# Patient Record
Sex: MALE | Race: WHITE | NOT HISPANIC OR LATINO | Employment: FULL TIME | URBAN - METROPOLITAN AREA
[De-identification: names, ages, dates, MRNs, and addresses within clinical notes are randomized per-mention and may not be internally consistent; named-entity substitution may affect disease eponyms.]

---

## 2023-07-15 DIAGNOSIS — I10 ESSENTIAL (PRIMARY) HYPERTENSION: ICD-10-CM

## 2023-07-17 PROBLEM — I10 HYPERTENSION: Status: ACTIVE | Noted: 2023-07-17

## 2023-07-17 PROBLEM — L72.3 INFLAMED SEBACEOUS CYST: Status: RESOLVED | Noted: 2023-07-17 | Resolved: 2023-07-17

## 2023-07-17 PROBLEM — I27.20 PULMONARY HYPERTENSION, MILD (MULTI): Status: ACTIVE | Noted: 2023-07-17

## 2023-07-17 PROBLEM — J93.83 SPONTANEOUS PNEUMOTHORAX: Status: RESOLVED | Noted: 2023-07-17 | Resolved: 2023-07-17

## 2023-07-17 PROBLEM — G47.33 OBSTRUCTIVE SLEEP APNEA, ADULT: Status: ACTIVE | Noted: 2023-07-17

## 2023-07-17 PROBLEM — S01.81XA LACERATION OF FACE, MULTIPLE SITES: Status: RESOLVED | Noted: 2023-07-17 | Resolved: 2023-07-17

## 2023-07-17 PROBLEM — E78.5 DYSLIPIDEMIA: Status: ACTIVE | Noted: 2023-07-17

## 2023-07-17 RX ORDER — AMLODIPINE AND BENAZEPRIL HYDROCHLORIDE 5; 10 MG/1; MG/1
1 CAPSULE ORAL DAILY
COMMUNITY

## 2023-07-17 RX ORDER — AMLODIPINE AND BENAZEPRIL HYDROCHLORIDE 5; 10 MG/1; MG/1
CAPSULE ORAL
Qty: 30 CAPSULE | Refills: 11 | Status: SHIPPED | OUTPATIENT
Start: 2023-07-17 | End: 2024-04-16 | Stop reason: SDUPTHER

## 2024-04-16 ENCOUNTER — OFFICE VISIT (OUTPATIENT)
Dept: PRIMARY CARE | Facility: CLINIC | Age: 44
End: 2024-04-16
Payer: COMMERCIAL

## 2024-04-16 ENCOUNTER — LAB (OUTPATIENT)
Dept: LAB | Facility: LAB | Age: 44
End: 2024-04-16
Payer: COMMERCIAL

## 2024-04-16 ENCOUNTER — HOSPITAL ENCOUNTER (OUTPATIENT)
Dept: RADIOLOGY | Facility: CLINIC | Age: 44
Discharge: HOME | End: 2024-04-16
Payer: COMMERCIAL

## 2024-04-16 VITALS
HEIGHT: 71 IN | BODY MASS INDEX: 23.38 KG/M2 | TEMPERATURE: 97.7 F | WEIGHT: 167 LBS | SYSTOLIC BLOOD PRESSURE: 130 MMHG | HEART RATE: 97 BPM | OXYGEN SATURATION: 100 % | DIASTOLIC BLOOD PRESSURE: 70 MMHG

## 2024-04-16 DIAGNOSIS — Z00.00 ROUTINE GENERAL MEDICAL EXAMINATION AT A HEALTH CARE FACILITY: Primary | ICD-10-CM

## 2024-04-16 DIAGNOSIS — M54.50 ACUTE BILATERAL LOW BACK PAIN WITHOUT SCIATICA: ICD-10-CM

## 2024-04-16 DIAGNOSIS — I27.20 PULMONARY HYPERTENSION, MILD (MULTI): ICD-10-CM

## 2024-04-16 DIAGNOSIS — Z00.00 ROUTINE GENERAL MEDICAL EXAMINATION AT A HEALTH CARE FACILITY: ICD-10-CM

## 2024-04-16 DIAGNOSIS — G47.33 OBSTRUCTIVE SLEEP APNEA, ADULT: ICD-10-CM

## 2024-04-16 LAB
APPEARANCE UR: CLEAR
BILIRUB UR STRIP.AUTO-MCNC: NEGATIVE MG/DL
COLOR UR: YELLOW
COTININE UR QL SCN: NEGATIVE
GLUCOSE UR STRIP.AUTO-MCNC: NEGATIVE MG/DL
KETONES UR STRIP.AUTO-MCNC: NEGATIVE MG/DL
LEUKOCYTE ESTERASE UR QL STRIP.AUTO: NEGATIVE
NITRITE UR QL STRIP.AUTO: NEGATIVE
PH UR STRIP.AUTO: 5 [PH]
PROT UR STRIP.AUTO-MCNC: NEGATIVE MG/DL
RBC # UR STRIP.AUTO: NEGATIVE /UL
SP GR UR STRIP.AUTO: 1.02
UROBILINOGEN UR STRIP.AUTO-MCNC: <2 MG/DL

## 2024-04-16 PROCEDURE — 99396 PREV VISIT EST AGE 40-64: CPT | Performed by: FAMILY MEDICINE

## 2024-04-16 PROCEDURE — 81003 URINALYSIS AUTO W/O SCOPE: CPT

## 2024-04-16 PROCEDURE — 72110 X-RAY EXAM L-2 SPINE 4/>VWS: CPT

## 2024-04-16 PROCEDURE — 72110 X-RAY EXAM L-2 SPINE 4/>VWS: CPT | Performed by: RADIOLOGY

## 2024-04-16 PROCEDURE — 3078F DIAST BP <80 MM HG: CPT | Performed by: FAMILY MEDICINE

## 2024-04-16 PROCEDURE — 3075F SYST BP GE 130 - 139MM HG: CPT | Performed by: FAMILY MEDICINE

## 2024-04-16 RX ORDER — PREDNISONE 10 MG/1
TABLET ORAL
COMMUNITY
Start: 2024-04-09

## 2024-04-16 ASSESSMENT — ENCOUNTER SYMPTOMS
FATIGUE: 0
COUGH: 0
SORE THROAT: 0
VOMITING: 0
BLOOD IN STOOL: 0
TROUBLE SWALLOWING: 0
JOINT SWELLING: 0
WEAKNESS: 0
CONFUSION: 0
PALPITATIONS: 0
HEADACHES: 0
SHORTNESS OF BREATH: 0
FEVER: 0
HALLUCINATIONS: 0
DYSURIA: 0
FREQUENCY: 0
DIZZINESS: 0
UNEXPECTED WEIGHT CHANGE: 0
EYE PAIN: 0
NAUSEA: 0
NUMBNESS: 0
DIARRHEA: 0
HEMATURIA: 0
ABDOMINAL PAIN: 0
BACK PAIN: 1
DECREASED CONCENTRATION: 0

## 2024-04-16 ASSESSMENT — PATIENT HEALTH QUESTIONNAIRE - PHQ9
1. LITTLE INTEREST OR PLEASURE IN DOING THINGS: NOT AT ALL
2. FEELING DOWN, DEPRESSED OR HOPELESS: NOT AT ALL
SUM OF ALL RESPONSES TO PHQ9 QUESTIONS 1 AND 2: 0

## 2024-04-16 ASSESSMENT — PAIN SCALES - GENERAL: PAINLEVEL: 1

## 2024-04-16 NOTE — PROGRESS NOTES
Subjective   Patient ID: Kvng Harper is a 44 y.o. male.    Kvng Harper comes in today for physical exam.  There has been no chest pain, shortness of breath, fever, chills, unexplained weight loss, rectal bleeding or any other unusual symptoms.  He did hold his back out but really had no initiating event.  He is on steroids.  It is quite a bit better but we discussed getting an x-ray to rule out pathology.  Blood pressure is at goal.  He has sleep apnea and is not tolerating CPAP very well.  Recent labs, diagnostics and pertinent information has been reviewed. Patient is attempting to eat a healthy diet and incorporate exercise in to their lifestyle. Patient does not smoke. Alcohol in moderation. Patient is practicing routine eye and dental care. Reviewed employment status. No uncontrolled anxiety, depression. Reviewed current medications, if any.  History of pulmonary hypertension and is due for another echo.          Review of Systems   Constitutional:  Negative for fatigue, fever and unexpected weight change.   HENT:  Negative for congestion, ear pain, hearing loss, sore throat and trouble swallowing.    Eyes:  Negative for pain and visual disturbance.   Respiratory:  Negative for cough and shortness of breath.    Cardiovascular:  Negative for chest pain, palpitations and leg swelling.   Gastrointestinal:  Negative for abdominal pain, blood in stool, diarrhea, nausea and vomiting.   Genitourinary:  Negative for dysuria, frequency, hematuria and urgency.   Musculoskeletal:  Positive for back pain. Negative for joint swelling.   Skin:  Negative for pallor and rash.   Neurological:  Negative for dizziness, syncope, weakness, numbness and headaches.   Psychiatric/Behavioral:  Negative for confusion, decreased concentration, hallucinations and suicidal ideas.      Vitals:    04/16/24 0902   BP: 130/70   Pulse: 97   Temp: 36.5 °C (97.7 °F)   SpO2: 100%      Objective   Physical Exam  Constitutional:        Appearance: Normal appearance. He is not toxic-appearing.   HENT:      Head: Normocephalic and atraumatic.      Right Ear: Tympanic membrane normal.      Left Ear: Tympanic membrane normal.      Nose: Nose normal.      Mouth/Throat:      Mouth: Mucous membranes are moist.      Pharynx: Oropharynx is clear. No oropharyngeal exudate or posterior oropharyngeal erythema.   Eyes:      General: No scleral icterus.     Extraocular Movements: Extraocular movements intact.      Conjunctiva/sclera: Conjunctivae normal.      Pupils: Pupils are equal, round, and reactive to light.   Cardiovascular:      Rate and Rhythm: Normal rate and regular rhythm.      Pulses: Normal pulses.   Pulmonary:      Effort: No respiratory distress.      Breath sounds: Normal breath sounds. No wheezing or rhonchi.   Abdominal:      General: Abdomen is flat. Bowel sounds are normal.      Palpations: Abdomen is soft. There is no mass.      Tenderness: There is no abdominal tenderness. There is no guarding.   Musculoskeletal:         General: Normal range of motion.      Cervical back: No rigidity or tenderness.   Lymphadenopathy:      Cervical: No cervical adenopathy.   Skin:     General: Skin is warm and dry.      Findings: No rash.   Neurological:      General: No focal deficit present.      Mental Status: He is alert and oriented to person, place, and time.      Cranial Nerves: No cranial nerve deficit.      Motor: No weakness.   Psychiatric:         Mood and Affect: Mood normal.         Behavior: Behavior normal.         Thought Content: Thought content normal.         Assessment/Plan   Diagnoses and all orders for this visit:  Obstructive sleep apnea, adult  -     Transthoracic Echo (TTE) Complete; Future  Pulmonary hypertension, mild (Multi)  -     Transthoracic Echo (TTE) Complete; Future  Routine general medical examination at a health care facility  -     CBC and Auto Differential; Future  -     Comprehensive Metabolic Panel; Future  -      Lipid Panel; Future  -     Thyroid Stimulating Hormone; Future  -     Urinalysis with Reflex Microscopic; Future  -     Vitamin D 25-Hydroxy,Total (for eval of Vitamin D levels); Future

## 2024-04-16 NOTE — PATIENT INSTRUCTIONS
It was nice to see you today!  Discussed current concerns and addressed   Reviewed recent labs and diagnostics  Reviewed medications list  Continue to eat a healthy diet, exercise at least 3 times a week or more  Plan and follow up discussed  For any further information related to your condition, copy and paste or go to [email or web address]  Check another echo to evaluate the pulmonary hypertension.  Continue prednisone and exercises for the back and we will check x-rays.  Routine labs ordered.

## 2024-04-18 ENCOUNTER — LAB (OUTPATIENT)
Dept: LAB | Facility: LAB | Age: 44
End: 2024-04-18
Payer: COMMERCIAL

## 2024-04-18 DIAGNOSIS — Z00.00 ROUTINE GENERAL MEDICAL EXAMINATION AT A HEALTH CARE FACILITY: ICD-10-CM

## 2024-04-18 LAB
25(OH)D3 SERPL-MCNC: 21 NG/ML (ref 30–100)
ALBUMIN SERPL BCP-MCNC: 4.8 G/DL (ref 3.4–5)
ALP SERPL-CCNC: 70 U/L (ref 33–120)
ALT SERPL W P-5'-P-CCNC: 50 U/L (ref 10–52)
ANION GAP SERPL CALC-SCNC: 13 MMOL/L (ref 10–20)
AST SERPL W P-5'-P-CCNC: 18 U/L (ref 9–39)
BASOPHILS # BLD AUTO: 0.02 X10*3/UL (ref 0–0.1)
BASOPHILS NFR BLD AUTO: 0.2 %
BILIRUB SERPL-MCNC: 0.6 MG/DL (ref 0–1.2)
BUN SERPL-MCNC: 20 MG/DL (ref 6–23)
CALCIUM SERPL-MCNC: 9.4 MG/DL (ref 8.6–10.3)
CHLORIDE SERPL-SCNC: 105 MMOL/L (ref 98–107)
CHOLEST SERPL-MCNC: 205 MG/DL (ref 0–199)
CHOLESTEROL/HDL RATIO: 3.3
CO2 SERPL-SCNC: 24 MMOL/L (ref 21–32)
CREAT SERPL-MCNC: 1.04 MG/DL (ref 0.5–1.3)
EGFRCR SERPLBLD CKD-EPI 2021: 90 ML/MIN/1.73M*2
EOSINOPHIL # BLD AUTO: 0.01 X10*3/UL (ref 0–0.7)
EOSINOPHIL NFR BLD AUTO: 0.1 %
ERYTHROCYTE [DISTWIDTH] IN BLOOD BY AUTOMATED COUNT: 12.8 % (ref 11.5–14.5)
GLUCOSE SERPL-MCNC: 102 MG/DL (ref 74–99)
HCT VFR BLD AUTO: 46.3 % (ref 41–52)
HDLC SERPL-MCNC: 61.5 MG/DL
HGB BLD-MCNC: 15.6 G/DL (ref 13.5–17.5)
IMM GRANULOCYTES # BLD AUTO: 0.12 X10*3/UL (ref 0–0.7)
IMM GRANULOCYTES NFR BLD AUTO: 1.4 % (ref 0–0.9)
LDLC SERPL CALC-MCNC: 132 MG/DL
LYMPHOCYTES # BLD AUTO: 1.38 X10*3/UL (ref 1.2–4.8)
LYMPHOCYTES NFR BLD AUTO: 15.6 %
MCH RBC QN AUTO: 28 PG (ref 26–34)
MCHC RBC AUTO-ENTMCNC: 33.7 G/DL (ref 32–36)
MCV RBC AUTO: 83 FL (ref 80–100)
MONOCYTES # BLD AUTO: 0.39 X10*3/UL (ref 0.1–1)
MONOCYTES NFR BLD AUTO: 4.4 %
NEUTROPHILS # BLD AUTO: 6.93 X10*3/UL (ref 1.2–7.7)
NEUTROPHILS NFR BLD AUTO: 78.3 %
NON HDL CHOLESTEROL: 144 MG/DL (ref 0–149)
NRBC BLD-RTO: 0 /100 WBCS (ref 0–0)
PLATELET # BLD AUTO: 272 X10*3/UL (ref 150–450)
POTASSIUM SERPL-SCNC: 4.3 MMOL/L (ref 3.5–5.3)
PROT SERPL-MCNC: 6.8 G/DL (ref 6.4–8.2)
RBC # BLD AUTO: 5.57 X10*6/UL (ref 4.5–5.9)
SODIUM SERPL-SCNC: 138 MMOL/L (ref 136–145)
TRIGL SERPL-MCNC: 57 MG/DL (ref 0–149)
TSH SERPL-ACNC: 0.75 MIU/L (ref 0.44–3.98)
VLDL: 11 MG/DL (ref 0–40)
WBC # BLD AUTO: 8.9 X10*3/UL (ref 4.4–11.3)

## 2024-04-18 PROCEDURE — 80053 COMPREHEN METABOLIC PANEL: CPT

## 2024-04-18 PROCEDURE — 85025 COMPLETE CBC W/AUTO DIFF WBC: CPT

## 2024-04-18 PROCEDURE — 82306 VITAMIN D 25 HYDROXY: CPT

## 2024-04-18 PROCEDURE — 84443 ASSAY THYROID STIM HORMONE: CPT

## 2024-04-18 PROCEDURE — 80061 LIPID PANEL: CPT

## 2024-04-18 PROCEDURE — 36415 COLL VENOUS BLD VENIPUNCTURE: CPT

## 2024-05-13 ENCOUNTER — APPOINTMENT (OUTPATIENT)
Dept: CARDIOLOGY | Facility: HOSPITAL | Age: 44
End: 2024-05-13
Payer: COMMERCIAL

## 2024-05-21 ENCOUNTER — HOSPITAL ENCOUNTER (OUTPATIENT)
Dept: CARDIOLOGY | Facility: HOSPITAL | Age: 44
Discharge: HOME | End: 2024-05-21
Payer: COMMERCIAL

## 2024-05-21 DIAGNOSIS — I27.20 PULMONARY HYPERTENSION, MILD (MULTI): ICD-10-CM

## 2024-05-21 DIAGNOSIS — G47.33 OBSTRUCTIVE SLEEP APNEA, ADULT: ICD-10-CM

## 2024-05-21 LAB
AORTIC VALVE MEAN GRADIENT: 2 MMHG
AORTIC VALVE PEAK VELOCITY: 0.93 M/S
AV PEAK GRADIENT: 3.4 MMHG
AVA (PEAK VEL): 3.48 CM2
AVA (VTI): 3.9 CM2
EJECTION FRACTION APICAL 4 CHAMBER: 66.9
LEFT ATRIUM VOLUME AREA LENGTH INDEX BSA: 10.3 ML/M2
LEFT VENTRICLE INTERNAL DIMENSION DIASTOLE: 5.1 CM (ref 3.5–6)
LEFT VENTRICULAR OUTFLOW TRACT DIAMETER: 2.36 CM
LV EJECTION FRACTION BIPLANE: 60 %
MITRAL VALVE E/A RATIO: 1.1
MITRAL VALVE E/E' RATIO: 5.06
RIGHT VENTRICLE FREE WALL PEAK S': 14 CM/S
RIGHT VENTRICLE PEAK SYSTOLIC PRESSURE: 33 MMHG
TRICUSPID ANNULAR PLANE SYSTOLIC EXCURSION: 1.8 CM

## 2024-05-21 PROCEDURE — 93306 TTE W/DOPPLER COMPLETE: CPT | Performed by: INTERNAL MEDICINE

## 2024-05-21 PROCEDURE — 93306 TTE W/DOPPLER COMPLETE: CPT

## 2024-06-24 DIAGNOSIS — I27.20 PULMONARY HYPERTENSION, MILD (MULTI): ICD-10-CM

## 2024-06-24 RX ORDER — AMLODIPINE AND BENAZEPRIL HYDROCHLORIDE 5; 10 MG/1; MG/1
1 CAPSULE ORAL DAILY
Qty: 90 CAPSULE | Refills: 3 | Status: SHIPPED | OUTPATIENT
Start: 2024-06-24

## 2025-06-07 DIAGNOSIS — I27.20 PULMONARY HYPERTENSION, MILD (MULTI): ICD-10-CM

## 2025-06-09 ENCOUNTER — APPOINTMENT (OUTPATIENT)
Dept: PRIMARY CARE | Facility: CLINIC | Age: 45
End: 2025-06-09
Payer: COMMERCIAL

## 2025-06-09 VITALS
SYSTOLIC BLOOD PRESSURE: 120 MMHG | OXYGEN SATURATION: 99 % | TEMPERATURE: 97.5 F | HEART RATE: 95 BPM | BODY MASS INDEX: 23.71 KG/M2 | WEIGHT: 170 LBS | DIASTOLIC BLOOD PRESSURE: 70 MMHG

## 2025-06-09 DIAGNOSIS — R73.9 ELEVATED BLOOD SUGAR: ICD-10-CM

## 2025-06-09 DIAGNOSIS — Z00.00 ROUTINE GENERAL MEDICAL EXAMINATION AT A HEALTH CARE FACILITY: Primary | ICD-10-CM

## 2025-06-09 PROBLEM — I27.20 PULMONARY HYPERTENSION, MILD (MULTI): Status: RESOLVED | Noted: 2023-07-17 | Resolved: 2025-06-09

## 2025-06-09 PROCEDURE — 99396 PREV VISIT EST AGE 40-64: CPT | Performed by: FAMILY MEDICINE

## 2025-06-09 PROCEDURE — 3074F SYST BP LT 130 MM HG: CPT | Performed by: FAMILY MEDICINE

## 2025-06-09 PROCEDURE — 1036F TOBACCO NON-USER: CPT | Performed by: FAMILY MEDICINE

## 2025-06-09 PROCEDURE — 3078F DIAST BP <80 MM HG: CPT | Performed by: FAMILY MEDICINE

## 2025-06-09 RX ORDER — AMLODIPINE AND BENAZEPRIL HYDROCHLORIDE 5; 10 MG/1; MG/1
1 CAPSULE ORAL DAILY
Qty: 90 CAPSULE | Refills: 3 | Status: SHIPPED | OUTPATIENT
Start: 2025-06-09

## 2025-06-09 ASSESSMENT — ENCOUNTER SYMPTOMS
DYSPHORIC MOOD: 0
WEAKNESS: 0
HEMATURIA: 0
SINUS PAIN: 0
TROUBLE SWALLOWING: 0
BLOOD IN STOOL: 0
ABDOMINAL PAIN: 0
NAUSEA: 0
SHORTNESS OF BREATH: 0
VOMITING: 0
FATIGUE: 0
FEVER: 0
LIGHT-HEADEDNESS: 0
JOINT SWELLING: 0
DYSURIA: 0
PALPITATIONS: 0
DIARRHEA: 0
COUGH: 0
UNEXPECTED WEIGHT CHANGE: 0
TREMORS: 0
BRUISES/BLEEDS EASILY: 0

## 2025-06-09 ASSESSMENT — PAIN SCALES - GENERAL: PAINLEVEL_OUTOF10: 0-NO PAIN

## 2025-06-09 NOTE — PATIENT INSTRUCTIONS
It was nice to see you today!  Discussed current concerns and addressed   Reviewed recent labs and diagnostics  Reviewed medications list  Continue to eat a healthy diet, exercise at least 3 times a week or more  Plan and follow up discussed  For any further information related to your condition, copy and paste or go to [email or web address]  Check echo next year

## 2025-06-09 NOTE — PROGRESS NOTES
Subjective   Patient ID: Kvng Harper is a 45 y.o. male.    Kvng Harper comes in today for physical exam.  There has been no chest pain, shortness of breath, fever, chills, unexplained weight loss, rectal bleeding or any other unusual symptoms.  Interestingly had pulmonary hypertension and some trace valvular regurg in 2021.  Repeat echo was normal with regards to the pulmonary hypertension.  He had COVID but thinks it was after that.  BMI is normal.  He is very healthy.  Body mass index is 23.71 kg/m².  Recent labs, diagnostics and pertinent information has been reviewed.  He is due for updated labs.  Patient is attempting to eat a healthy diet and incorporate exercise in to their lifestyle. Patient does not smoke. Alcohol in moderation. Patient is practicing routine eye and dental care. Reviewed employment status. No uncontrolled anxiety, depression. Reviewed current medications, if any.          Review of Systems   Constitutional:  Negative for fatigue, fever and unexpected weight change.   HENT:  Negative for congestion, ear pain, nosebleeds, sinus pain and trouble swallowing.    Eyes:  Negative for visual disturbance.   Respiratory:  Negative for cough and shortness of breath.    Cardiovascular:  Negative for chest pain and palpitations.   Gastrointestinal:  Negative for abdominal pain, blood in stool, diarrhea, nausea and vomiting.   Genitourinary:  Negative for dysuria and hematuria.   Musculoskeletal:  Negative for gait problem and joint swelling.   Neurological:  Negative for tremors, weakness and light-headedness.   Hematological:  Does not bruise/bleed easily.   Psychiatric/Behavioral:  Negative for dysphoric mood and suicidal ideas.      Vitals:    06/09/25 1531   BP: 120/70   Pulse: 95   Temp: 36.4 °C (97.5 °F)   SpO2: 99%      Body mass index is 23.71 kg/m².  Objective   Physical Exam  Constitutional:       Appearance: Normal appearance. He is not toxic-appearing.   HENT:      Head:  Normocephalic and atraumatic.      Right Ear: Tympanic membrane normal.      Left Ear: Tympanic membrane normal.      Nose: Nose normal.      Mouth/Throat:      Mouth: Mucous membranes are moist.      Pharynx: Oropharynx is clear. No oropharyngeal exudate or posterior oropharyngeal erythema.   Eyes:      General: No scleral icterus.     Extraocular Movements: Extraocular movements intact.      Conjunctiva/sclera: Conjunctivae normal.      Pupils: Pupils are equal, round, and reactive to light.   Cardiovascular:      Rate and Rhythm: Normal rate and regular rhythm.      Pulses: Normal pulses.   Pulmonary:      Effort: No respiratory distress.      Breath sounds: Normal breath sounds. No wheezing or rhonchi.   Abdominal:      General: Abdomen is flat. Bowel sounds are normal.      Palpations: Abdomen is soft. There is no mass.      Tenderness: There is no abdominal tenderness. There is no guarding.   Musculoskeletal:         General: Normal range of motion.      Cervical back: No rigidity or tenderness.   Lymphadenopathy:      Cervical: No cervical adenopathy.   Skin:     General: Skin is warm and dry.      Findings: No rash.   Neurological:      General: No focal deficit present.      Mental Status: He is alert and oriented to person, place, and time.      Cranial Nerves: No cranial nerve deficit.      Motor: No weakness.   Psychiatric:         Mood and Affect: Mood normal.         Behavior: Behavior normal.         Thought Content: Thought content normal.         Last Labs:     CMP:   Lab Results   Component Value Date    CALCIUM 9.4 04/18/2024    CALCIUM 9.4 02/23/2021    PROT 6.8 04/18/2024    PROT 6.8 02/23/2021    ALBUMIN 4.8 04/18/2024    ALBUMIN 4.9 02/23/2021    AST 18 04/18/2024    AST 19 02/23/2021    ALKPHOS 70 04/18/2024    ALKPHOS 81 02/23/2021    BILITOT 0.6 04/18/2024    BILITOT 0.6 02/23/2021     CBC:   Lab Results   Component Value Date    WBC 8.9 04/18/2024    WBC 4.7 02/23/2021    HGB 15.6  "04/18/2024    HGB 15.6 02/23/2021    HCT 46.3 04/18/2024    HCT 45.6 02/23/2021    MCV 83 04/18/2024    MCV 84 02/23/2021     04/18/2024     02/23/2021     A1C:   Lab Results   Component Value Date    HGBA1C 4.9 02/23/2021     LIPID PANEL:   Lab Results   Component Value Date    CHOL 205 (H) 04/18/2024    CHOL 213 (H) 02/23/2021    TRIG 57 04/18/2024    TRIG 82 02/23/2021    HDL 61.5 04/18/2024    HDL 48.7 02/23/2021    CHHDL 3.3 04/18/2024    CHHDL 4.4 02/23/2021    LDLF 148 (H) 02/23/2021    VLDL 11 04/18/2024    VLDL 16 02/23/2021    NHDL 144 04/18/2024     TSH:   Lab Results   Component Value Date    TSH 0.75 04/18/2024    TSH 2.00 02/23/2021     PSA:   No results found for: \"PSA\"     Assessment/Plan   There are no diagnoses linked to this encounter.    "

## 2025-07-16 LAB
25(OH)D3+25(OH)D2 SERPL-MCNC: 32 NG/ML (ref 30–100)
ALBUMIN SERPL-MCNC: 4.8 G/DL (ref 3.6–5.1)
ALP SERPL-CCNC: 75 U/L (ref 36–130)
ALT SERPL-CCNC: 25 U/L (ref 9–46)
ANION GAP SERPL CALCULATED.4IONS-SCNC: 8 MMOL/L (CALC) (ref 7–17)
APPEARANCE UR: CLEAR
AST SERPL-CCNC: 18 U/L (ref 10–40)
BACTERIA #/AREA URNS HPF: ABNORMAL /HPF
BASOPHILS # BLD AUTO: 30 CELLS/UL (ref 0–200)
BASOPHILS NFR BLD AUTO: 0.6 %
BILIRUB SERPL-MCNC: 0.6 MG/DL (ref 0.2–1.2)
BILIRUB UR QL STRIP: NEGATIVE
BUN SERPL-MCNC: 16 MG/DL (ref 7–25)
CALCIUM SERPL-MCNC: 9.3 MG/DL (ref 8.6–10.3)
CHLORIDE SERPL-SCNC: 108 MMOL/L (ref 98–110)
CHOLEST SERPL-MCNC: 217 MG/DL
CHOLEST/HDLC SERPL: 3.9 (CALC)
CO2 SERPL-SCNC: 25 MMOL/L (ref 20–32)
COLOR UR: YELLOW
CREAT SERPL-MCNC: 1.3 MG/DL (ref 0.6–1.29)
EGFRCR SERPLBLD CKD-EPI 2021: 68 ML/MIN/1.73M2
EOSINOPHIL # BLD AUTO: 110 CELLS/UL (ref 15–500)
EOSINOPHIL NFR BLD AUTO: 2.2 %
ERYTHROCYTE [DISTWIDTH] IN BLOOD BY AUTOMATED COUNT: 13 % (ref 11–15)
EST. AVERAGE GLUCOSE BLD GHB EST-MCNC: 108 MG/DL
EST. AVERAGE GLUCOSE BLD GHB EST-SCNC: 6 MMOL/L
GLUCOSE SERPL-MCNC: 92 MG/DL (ref 65–99)
GLUCOSE UR QL STRIP: NEGATIVE
HBA1C MFR BLD: 5.4 %
HCT VFR BLD AUTO: 45.5 % (ref 38.5–50)
HDLC SERPL-MCNC: 55 MG/DL
HGB BLD-MCNC: 14.9 G/DL (ref 13.2–17.1)
HGB UR QL STRIP: NEGATIVE
HYALINE CASTS #/AREA URNS LPF: ABNORMAL /LPF
KETONES UR QL STRIP: ABNORMAL
LDLC SERPL CALC-MCNC: 146 MG/DL (CALC)
LEUKOCYTE ESTERASE UR QL STRIP: NEGATIVE
LYMPHOCYTES # BLD AUTO: 1270 CELLS/UL (ref 850–3900)
LYMPHOCYTES NFR BLD AUTO: 25.4 %
MCH RBC QN AUTO: 28.4 PG (ref 27–33)
MCHC RBC AUTO-ENTMCNC: 32.7 G/DL (ref 32–36)
MCV RBC AUTO: 86.8 FL (ref 80–100)
MONOCYTES # BLD AUTO: 370 CELLS/UL (ref 200–950)
MONOCYTES NFR BLD AUTO: 7.4 %
NEUTROPHILS # BLD AUTO: 3220 CELLS/UL (ref 1500–7800)
NEUTROPHILS NFR BLD AUTO: 64.4 %
NITRITE UR QL STRIP: NEGATIVE
NONHDLC SERPL-MCNC: 162 MG/DL (CALC)
PH UR STRIP: 5.5 [PH] (ref 5–8)
PLATELET # BLD AUTO: 195 THOUSAND/UL (ref 140–400)
PMV BLD REES-ECKER: 11.5 FL (ref 7.5–12.5)
POTASSIUM SERPL-SCNC: 4.8 MMOL/L (ref 3.5–5.3)
PROT SERPL-MCNC: 6.7 G/DL (ref 6.1–8.1)
PROT UR QL STRIP: NEGATIVE
RBC # BLD AUTO: 5.24 MILLION/UL (ref 4.2–5.8)
RBC #/AREA URNS HPF: ABNORMAL /HPF
SODIUM SERPL-SCNC: 141 MMOL/L (ref 135–146)
SP GR UR STRIP: 1.02 (ref 1–1.03)
SQUAMOUS #/AREA URNS HPF: ABNORMAL /HPF
TRIGL SERPL-MCNC: 66 MG/DL
WBC # BLD AUTO: 5 THOUSAND/UL (ref 3.8–10.8)
WBC #/AREA URNS HPF: ABNORMAL /HPF